# Patient Record
(demographics unavailable — no encounter records)

---

## 2024-10-15 NOTE — ASSESSMENT
[FreeTextEntry1] : EKG demonstrates sinus bradycardia at a rate of 53; borderline left atrial enlargement.  Otherwise no acute changes;    In summary this 71-year-old gentleman has a remote history for paroxysmal atrial fibrillation which has been well controlled and in normal sinus rhythm on propafenone antiarrhythmic.    He has had chronic hypertension which has been under reasonably good control on current medical regimen and he has been asymptomatic from the cardiac standpoint and exercising on a regular basis.    His recent transthoracic echocardiogram showed preserved cardiac chamber sizes with normal systolic function of the left ventricle and normal EF of 55 to 60%.  There is some mild calcification of the aortic valve which opens normally.  There was trace mitral;, tricuspid and pulmonic insufficiency.  And no pericardial effusion.  Last nuclear stress test performed in November 2022 was negative for ischemia on the myocardial perfusion images;  Otherwise, he has had a stable cardiac pattern and appears otherwise hemodynamically stable;     Plan:  Recommend patient schedule carotid duplex study and echocardiogram prior to next visit to assess cardiac function and carotid plaquing stenosis;  Okay to continue current medical regimen;  Discussed possibility of future cardiac stress test but not urgent at this time;  Patient to report any untoward chest symptoms between now and next visit;  Continue regular checkups and laboratory blood test with lipid profile with primary care encouraged;

## 2024-10-15 NOTE — REASON FOR VISIT
[FreeTextEntry1] : FLORENTINO BERRIOS is being seen for arrhythmia/ECG abnormalities, hyperlipidemia, hypertension and carotid, aortic and peripheral vascular disease.   The patient is a 71-year-old white male with known history for paroxysmal atrial fibrillation in the past with hypertension and abnormal EKG, borderline hyperlipidemia; and mild calcification of the carotid arteries and plaquing;  He presents back to the office today for general cardiac checkup;   He denies any significant chest pain, SOB, palpitations or dizziness;;   He has been doing a moderate amount of physical activity and exercise on a regular basis; he continues to take propafenone for PAF which has successfully maintained normal sinus rhythm/sinus bradycardia;  Previous Exercise nuclear stress test performed November 4, 2022 shows good exercise tolerance into stage IV. 11 METS workload. No symptoms of chest pain. Normal blood pressure response; No arrhythmias seen. 2 mm of ST segment depressions in the inferior lateral leads at peak exercise of questionable significance-- However, the myocardial perfusion images showed normal perfusion throughout without any evidence of ischemia or defects. I.e. negative test. LVEF normal at 67%;

## 2024-10-15 NOTE — HISTORY OF PRESENT ILLNESS
[FreeTextEntry1] : Patient expects to travel to [facility] for much of the winter;  Has been taking his medications regularly without difficulty;  Has not noted any palpitations or arrhythmia while taking propafenone;

## 2025-06-02 NOTE — HISTORY OF PRESENT ILLNESS
[FreeTextEntry1] : Patient expects to travel to [facility] for much of the winter;  Has been taking his medications regularly without difficulty;  Has not noted any palpitations or arrhythmia while taking propafenone;

## 2025-06-02 NOTE — ASSESSMENT
[FreeTextEntry1] : EKG demonstrates sinus bradycardia at a rate of 58; borderline left atrial enlargement.  Otherwise no acute changes;  In summary this 71-year-old gentleman has a remote history for paroxysmal atrial fibrillation which has been well controlled and in normal sinus rhythm on propafenone antiarrhythmic.    He has had chronic hypertension which has been under reasonably good control on current medical regimen and he has been asymptomatic from the cardiac standpoint and exercising on a regular basis.   Once again, patient was intolerant with yet another Statin medication (Crestor), causing multiple joint and muscle pains.   Most recent fasting labs (March show elevated Total Cholesterol 221, , HDL 66, Triglycerides 80.     His recent transthoracic echocardiogram showed preserved cardiac chamber sizes with normal systolic function of the left ventricle and normal EF of 55 to 60%.  There is some mild calcification of the aortic valve which opens normally.  There was trace mitral;, tricuspid and pulmonic insufficiency.  And no pericardial effusion.  Last nuclear stress test performed in November 2022 was negative for ischemia on the myocardial perfusion images;  Otherwise, he has had a stable cardiac pattern and appears otherwise hemodynamically stable;     Plan:  Recommend patient begin Repatha twice monthly in hopes of more optimal lipid control.   Okay to continue current medical regimen; Recheck fasting labs within 3 months or PRN. (script given to patient).    Discussed possibility of future cardiac stress test but not urgent at this time;  Patient to report any untoward chest symptoms between now and next visit;  Continue regular checkups and laboratory blood test with lipid profile with primary care encouraged;  Diet and lifestyle modification discussed including low sodium, low fat and low carbohydrate weight reducing diet.  Patient is to implement aerobic exercise regimen few days per week.   Follow up with Dr. Mendoza within 6 months or PRN.

## 2025-06-02 NOTE — HISTORY OF PRESENT ILLNESS
[FreeTextEntry1] : Patient expects to travel to [facility] for much of the winter;  Has been taking his medications regularly without difficulty;  Has not noted any palpitations or arrhythmia while taking propafenone;

## 2025-06-02 NOTE — REASON FOR VISIT
[FreeTextEntry1] : FLORENTINO BERRIOS is being seen for arrhythmia/ECG abnormalities, hyperlipidemia, hypertension and carotid, aortic and peripheral vascular disease.   The patient is a 71-year-old white male with known history for paroxysmal atrial fibrillation in the past with hypertension and abnormal EKG, borderline hyperlipidemia; and mild calcification of the carotid arteries and plaquing;  He presents back to the office today for general cardiac checkup;   Once again, patient was intolerant with yet another Statin medication (Crestor), causing multiple joint and muscle pains.   Most recent fasting labs (March show elevated Total Cholesterol 221, , HDL 66, Triglycerides 80.   He denies any significant chest pain, SOB, palpitations or dizziness;;   He has been doing a moderate amount of physical activity and exercise on a regular basis; he continues to take propafenone for PAF which has successfully maintained normal sinus rhythm/sinus bradycardia;  Previous Exercise nuclear stress test performed November 4, 2022 shows good exercise tolerance into stage IV. 11 METS workload. No symptoms of chest pain. Normal blood pressure response; No arrhythmias seen. 2 mm of ST segment depressions in the inferior lateral leads at peak exercise of questionable significance-- However, the myocardial perfusion images showed normal perfusion throughout without any evidence of ischemia or defects. I.e. negative test. LVEF normal at 67%;